# Patient Record
Sex: FEMALE | Race: WHITE | NOT HISPANIC OR LATINO | Employment: FULL TIME | URBAN - METROPOLITAN AREA
[De-identification: names, ages, dates, MRNs, and addresses within clinical notes are randomized per-mention and may not be internally consistent; named-entity substitution may affect disease eponyms.]

---

## 2019-09-12 ENCOUNTER — TELEPHONE (OUTPATIENT)
Dept: PHARMACY | Facility: CLINIC | Age: 29
End: 2019-09-12

## 2019-09-12 NOTE — TELEPHONE ENCOUNTER
Informed Patient  that [facility] received prescription for Viread and benefits investigation is required.  [facility] will be back in touch once insurance determination is received.

## 2019-09-24 ENCOUNTER — TELEPHONE (OUTPATIENT)
Dept: PHARMACY | Facility: CLINIC | Age: 29
End: 2019-09-24

## 2019-09-24 NOTE — TELEPHONE ENCOUNTER
Informed Patient  that [facility] received prescription for Vemlidy and prior authorization is required.  [facility] will be back in touch once insurance determination is received.

## 2019-09-30 NOTE — TELEPHONE ENCOUNTER
Documentation Only:    Prior Authorization for Vemlidy has been approved until 09/26/20    Case ID#: [id number]    Patient co-pay: $1154.41    Patient Assistance IS required.    Patient has been enrolled in Vemlidy copay card program which will reduce patient copay to $0. Copay card info is listed below.    Vemlidy Copay Card  RxBIN: [id number]  RxPCN: [id number]  RxGRP: [id number]  ID: [id number]    Forwarding to clinical pharmacist for consult and shipment.    KRISTOPHER

## 2019-10-02 ENCOUNTER — TELEPHONE (OUTPATIENT)
Dept: PHARMACY | Facility: CLINIC | Age: 29
End: 2019-10-02

## 2019-10-02 NOTE — TELEPHONE ENCOUNTER
We will ship Vemlidy refill on 10/3 via [employer] to arrive on 10/4. $0.00 copay- 004. Confirmed 2 patient identifiers - name and .     Patient is returning to Vemlidy after an insurance change, patient is not new to medication.    Counseling was reviewed:              1. Patient MUST take Vemlidy at the SAME time every day with food.              2. Side effects include, but not limited to: headache and fatigue.  Patient may treat with OTC remedies. If Tylenol is used, dose should  not exceed 2000mg per day.              Contact MD if any signs of allergic reaction, kidney problems (weight gain, urine changes, blood in urine), liver problems (dark urine/light stools, jaundice, abdominal pains), too much lactic acid in the blood (fast heartbeat/breathing, upset stomach or vomiting, SOB, major fatigue/dizziness, feeling cold), bone pain, muscle pain/weakness.           3. Medication list reviewed. No DDIs or allergies noted. Patient MUST contact myself or provider prior to starting any new OTC, herbal, or prescription drugs to avoid potential DDIs.  Patient reports she administers milk thistle (No DDI noted) patient aware to contact provider of [facility] prior to starting any additional herbal, OTC, of prescription drugs.       This drug does not stop the spread of diseases like HIV or hepatitis that are passed through blood or having sex. Do not have any kind of sex without using a latex or polyurethane condom. Do not share needles or other things like toothbrushes or razors. Talk with your doctor.  Tell your doctor if you are pregnant or plan on getting pregnant. You will need to talk about the benefits and risks of using this drug while you are pregnant.    Discussed the importance of staying well hydrated while on therapy. Compliance stressed - patient to take missed doses as soon as remembered, but NOT to take 2 doses in one day. Patient will report questions or concerns to myself or practitioner. Patient verbalizes  understanding. Consultation included: indication; goals of treatment; administration; storage and handling; side effects; how to handle side effects; the importance of compliance; how to handle missed doses; the importance of laboratory monitoring; the importance of keeping all follow up appointments.  Patient understands to report any medication changes to [facility] and provider. All questions answered and addressed to patient's satisfaction. [facility] will follow-up up in 1 week.

## 2019-10-10 NOTE — TELEPHONE ENCOUNTER
7 day touch base:   Confirmed 2 patient identifiers - name and .     No answer, left voicemail for call back, will follow-up.

## 2019-11-01 NOTE — TELEPHONE ENCOUNTER
"Confirmed 2 patient identifiers - name and .     During refill call with Shani patient reported 2 missed doses transferred over for follow-up. Ms Quezada is a very pleasant woman, this is her first follow-up for Vemlidy, She reports missing 2 doses of medication but was unsure of the exact dates. She reports appropriately not attempting to "catch-up" on doses and starting fresh the next day when she realized she missed a dose. We discussed the importance of compliance and some techniques that may be useful for her to reminder to take her medication. Ms Perez agree to set an alarm on her phone and she will take her medication first thing in the morning with breakfast to implement a daily routine. Patient reports some tolerable fatigue, patient encouraged to drink plenty of water and stay hydrated. No missed doses, or health conditions reported at this time. All questions answered and addressed to patient's satisfaction. Patient verbalized appropriate understanding. Shani completed refill.           "

## 2019-12-24 ENCOUNTER — TELEPHONE (OUTPATIENT)
Dept: PHARMACY | Facility: CLINIC | Age: 29
End: 2019-12-24

## 2019-12-24 NOTE — TELEPHONE ENCOUNTER
Vemlidy refill confirmed. We will ship Vemlidy refill on  via [employer] to arrive on . $0.00 copay- 004. Confirmed 2 patient identifiers - name and . Therapy appropriate.     Patient has 3 days of Vemlidy remaining and takes it around bedtime daily with food.  Pt reports they are not having any side effects so far. No missed doses, no new medications, no new allergies or health conditions reported at this time.  Patient counseled on importance of maintaining adherence due to viral resistance. She is keeping lab appointments which were scheduled. She is keeping her Vemlidy by her bedside table and has NOT missed any doses since changing her routine. Also, she is setting alarms. All questions answered and addressed to patients satisfaction. Advised to call [facility] and provider if any issues arise.  Pt verbalized understanding.    Shipping to temporary address:  [address] [place] [id number]

## 2020-01-20 ENCOUNTER — TELEPHONE (OUTPATIENT)
Dept: PHARMACY | Facility: CLINIC | Age: 30
End: 2020-01-20

## 2020-01-20 NOTE — TELEPHONE ENCOUNTER
Vemlidy refill attempted (Attempt 1). No answer. LVM for call back. Will f/u with patient if no return call.

## 2020-02-26 ENCOUNTER — TELEPHONE (OUTPATIENT)
Dept: PHARMACY | Facility: CLINIC | Age: 30
End: 2020-02-26

## 2020-03-25 ENCOUNTER — TELEPHONE (OUTPATIENT)
Dept: PHARMACY | Facility: CLINIC | Age: 30
End: 2020-03-25

## 2020-04-21 DIAGNOSIS — Z01.84 ANTIBODY RESPONSE EXAMINATION: ICD-10-CM

## 2020-04-22 ENCOUNTER — TELEPHONE (OUTPATIENT)
Dept: PHARMACY | Facility: CLINIC | Age: 30
End: 2020-04-22

## 2020-04-24 NOTE — TELEPHONE ENCOUNTER
Vemlidy refill confirmed and reassessment complete. We will ship Vemlidy refill on  via fedex to arrive on . $0 copay- 004. Confirmed 2 patient identifiers - name and . Therapy appropriate.     Patient has 7 doses of Vemlidy remaining and takes it around breakfast daily.  Pt reports they are not having any side effects so far. No missed doses, no new medications, no new allergies or health conditions reported at this time. Allergies reviewed and medication reconciliation complete (reviewed and documented in [facility]).  Disease education reviewed (including transmission and prevention). Patient counseled on importance of maintaining adherence and keeping lab appointments which were scheduled. All questions answered and addressed to patients satisfaction. Advised to call [facility] and provider if any issues arise.  Pt verbalized understanding.

## 2020-05-20 ENCOUNTER — TELEPHONE (OUTPATIENT)
Dept: PHARMACY | Facility: CLINIC | Age: 30
End: 2020-05-20

## 2020-05-21 ENCOUNTER — LAB VISIT (OUTPATIENT)
Dept: URGENT CARE | Facility: CLINIC | Age: 30
End: 2020-05-21
Payer: COMMERCIAL

## 2020-05-21 VITALS
WEIGHT: 120 LBS | RESPIRATION RATE: 18 BRPM | SYSTOLIC BLOOD PRESSURE: 114 MMHG | HEIGHT: 62 IN | OXYGEN SATURATION: 100 % | DIASTOLIC BLOOD PRESSURE: 80 MMHG | TEMPERATURE: 99 F | HEART RATE: 82 BPM | BODY MASS INDEX: 22.08 KG/M2

## 2020-05-21 DIAGNOSIS — Z11.59 SCREENING FOR VIRAL DISEASE: Primary | ICD-10-CM

## 2020-05-21 DIAGNOSIS — Z01.84 ANTIBODY RESPONSE EXAMINATION: ICD-10-CM

## 2020-05-21 LAB — SARS-COV-2 IGG SERPLBLD QL IA.RAPID: NEGATIVE

## 2020-05-21 PROCEDURE — U0003 INFECTIOUS AGENT DETECTION BY NUCLEIC ACID (DNA OR RNA); SEVERE ACUTE RESPIRATORY SYNDROME CORONAVIRUS 2 (SARS-COV-2) (CORONAVIRUS DISEASE [COVID-19]), AMPLIFIED PROBE TECHNIQUE, MAKING USE OF HIGH THROUGHPUT TECHNOLOGIES AS DESCRIBED BY CMS-2020-01-R: HCPCS

## 2020-05-21 PROCEDURE — 86769 SARS-COV-2 COVID-19 ANTIBODY: CPT

## 2020-05-21 NOTE — PROGRESS NOTES
"Subjective:       Patient ID: Pilar Perez is a 29 y.o. female.    Vitals:  height is 5' 2" (1.575 m) and weight is 54.4 kg (120 lb). Her temperature is 98.8 °F (37.1 °C). Her blood pressure is 114/80 and her pulse is 82. Her respiration is 18 and oxygen saturation is 100%.     Chief Complaint: COVID-19 Concerns    Patient is here for COVID IgG & swab testing.  No symptoms.  No exposure.      Constitution: Negative for chills, sweating, fatigue and fever.   HENT: Negative for ear pain, congestion, sinus pain, sinus pressure, sore throat and voice change.    Neck: Negative for painful lymph nodes.   Cardiovascular: Negative for chest pain and leg swelling.   Eyes: Negative for eye redness, double vision and blurred vision.   Respiratory: Negative for chest tightness, cough, sputum production, bloody sputum, COPD, shortness of breath, stridor, wheezing and asthma.    Gastrointestinal: Negative for nausea, vomiting and diarrhea.   Genitourinary: Negative for dysuria, frequency, urgency and history of kidney stones.   Musculoskeletal: Negative for joint pain, joint swelling, muscle cramps and muscle ache.   Skin: Negative for color change, pale, rash and bruising.   Allergic/Immunologic: Negative for seasonal allergies and asthma.   Neurological: Negative for dizziness, history of vertigo, light-headedness, passing out and headaches.   Hematologic/Lymphatic: Negative for swollen lymph nodes.   Psychiatric/Behavioral: Negative for nervous/anxious, sleep disturbance and depression. The patient is not nervous/anxious.        Objective:      Physical Exam   Constitutional: She is oriented to person, place, and time. She appears well-developed and well-nourished. No distress.   HENT:   Head: Atraumatic.   Eyes: EOM are normal.   Neck: Neck supple.   Pulmonary/Chest: No respiratory distress.   Neurological: She is alert and oriented to person, place, and time.   Skin: Skin is warm and not diaphoretic.   Psychiatric: She has a " normal mood and affect. Her behavior is normal.   Nursing note and vitals reviewed.        Assessment:       1. Screening for viral disease    2. Antibody response examination        Plan:         Screening for viral disease  -     COVID-19 Routine Screening    Antibody response examination  -     COVID-19 (SARS CoV-2) IgG Antibody

## 2020-05-22 LAB — SARS-COV-2 RNA RESP QL NAA+PROBE: NOT DETECTED

## 2020-05-24 ENCOUNTER — TELEPHONE (OUTPATIENT)
Dept: URGENT CARE | Facility: CLINIC | Age: 30
End: 2020-05-24

## 2020-05-28 NOTE — TELEPHONE ENCOUNTER
Call attempt 3 for Vemlidy refill. Mailbox full, unable to leave VM. Sent [employer] message. Copay $0 at 004. Opening intervention for 4th call attempt

## 2020-07-27 NOTE — TELEPHONE ENCOUNTER
Action Required:     I have faxed over  assistance forms to your office. The forms need to be completed by the physician for the patient's VEMLIDY prescription assistance. Please complete the prescription sections of the forms.     Once completed, you can fax them to [facility]. Any questions or concerns regarding the program or completion of the forms, please reach out to Lisa PATRICIA at [facility].     Thank you,    Lisa Jon  [facility] [phone] (I)